# Patient Record
Sex: FEMALE | Race: WHITE | Employment: FULL TIME | URBAN - METROPOLITAN AREA
[De-identification: names, ages, dates, MRNs, and addresses within clinical notes are randomized per-mention and may not be internally consistent; named-entity substitution may affect disease eponyms.]

---

## 2017-02-20 ENCOUNTER — TELEPHONE (OUTPATIENT)
Dept: OBGYN CLINIC | Facility: CLINIC | Age: 32
End: 2017-02-20

## 2017-02-20 NOTE — TELEPHONE ENCOUNTER
Pt confirms message below and LMP of 1/21/17. Pt stated she is taking PNV and will check to make sure it contains DHA. Pt informed that our practice has 6 doctors (2 male, 3 female) and pt will see all of them throughout PN care.  Pt verbalized understandin

## 2017-02-23 ENCOUNTER — TELEPHONE (OUTPATIENT)
Dept: OBGYN CLINIC | Facility: CLINIC | Age: 32
End: 2017-02-23

## 2017-02-23 NOTE — TELEPHONE ENCOUNTER
PER PT STATE SHE NEED TO GET AN ROOT CANAL / PT WANT TO KNOW IF THE MEDICATION THAT IS USED FOR THAT PROCEDURE IS OK SINCE SHE'S PREGNANT /  PLS ADV

## 2017-02-23 NOTE — TELEPHONE ENCOUNTER
FAX # [phone] PAULA LEE. Pt states she needs this faxed over by 3/3/17; pls call once faxed lv alvaro.

## 2017-02-23 NOTE — TELEPHONE ENCOUNTER
LM with pt stating that dental letter has been faxed to number listed below. Pt to call with any questions or concerns.

## 2017-02-23 NOTE — TELEPHONE ENCOUNTER
Pt states that she needs to have a root canal.  Pt is not sure of the dentist or endodontist who will be doing it. Informed pt once she finds out who is doing the procedure to call us and we will send a letter with what medication she can use.   Pt stated

## 2017-03-16 ENCOUNTER — TELEPHONE (OUTPATIENT)
Dept: OBGYN CLINIC | Facility: CLINIC | Age: 32
End: 2017-03-16

## 2017-03-16 ENCOUNTER — NURSE ONLY (OUTPATIENT)
Dept: OBGYN CLINIC | Facility: CLINIC | Age: 32
End: 2017-03-16

## 2017-03-16 VITALS — WEIGHT: 132.38 LBS | BODY MASS INDEX: 23.75 KG/M2 | HEIGHT: 62.75 IN

## 2017-03-16 DIAGNOSIS — Z34.01 ENCOUNTER FOR SUPERVISION OF NORMAL FIRST PREGNANCY IN FIRST TRIMESTER: Primary | ICD-10-CM

## 2017-03-16 DIAGNOSIS — Z32.01 PREGNANCY EXAMINATION OR TEST, POSITIVE RESULT: ICD-10-CM

## 2017-03-16 LAB
CONTROL LINE PRESENT WITH A CLEAR BACKGROUND (YES/NO): YES YES/NO
KIT LOT #: ABNORMAL NUMERIC
PREGNANCY TEST, URINE: POSITIVE

## 2017-03-16 PROCEDURE — 81025 URINE PREGNANCY TEST: CPT | Performed by: OBSTETRICS & GYNECOLOGY

## 2017-03-16 NOTE — PROGRESS NOTES
Pt seen for OBN appt today with no complaints. Normal PN labs, Hep C and US ordered. Pt advised all labs must be completed and resulted prior to MD appt. NPN appt with MD scheduled with ELISABETH on 4/5/17.     Pt is currently taking Vitamin C and D supplement Mental Retardation/Autism Yes Pt's brother has smith-magenis syndrome (partial deletion of a chromosome)   Muscular Dystrophy No    Neural tube defects No    Sickle Cell Disease or trait No    Zack-Sachs Disease No    Thalassemia No    Other inherited gen

## 2017-03-16 NOTE — TELEPHONE ENCOUNTER
Pt was seen for her OBN appt today. Pt states she sees a chiropractor every other week and is wondering if it safe to continue. Pt states the chiropractor does see other expectant moms. Message to [facility] on call.

## 2017-03-20 NOTE — TELEPHONE ENCOUNTER
Safe to continue as long as chiropractor knows she is pregnant and will take normal precautions and is comfortable with her being pregnant.

## 2017-04-05 ENCOUNTER — LAB ENCOUNTER (OUTPATIENT)
Dept: LAB | Facility: HOSPITAL | Age: 32
End: 2017-04-05
Attending: OBSTETRICS & GYNECOLOGY
Payer: COMMERCIAL

## 2017-04-05 ENCOUNTER — INITIAL PRENATAL (OUTPATIENT)
Dept: OBGYN CLINIC | Facility: CLINIC | Age: 32
End: 2017-04-05

## 2017-04-05 VITALS
SYSTOLIC BLOOD PRESSURE: 127 MMHG | BODY MASS INDEX: 24 KG/M2 | HEART RATE: 68 BPM | WEIGHT: 133.81 LBS | DIASTOLIC BLOOD PRESSURE: 79 MMHG

## 2017-04-05 DIAGNOSIS — Z34.91 ENCOUNTER FOR SUPERVISION OF NORMAL PREGNANCY IN FIRST TRIMESTER, UNSPECIFIED GRAVIDITY: Primary | ICD-10-CM

## 2017-04-05 DIAGNOSIS — Z34.01 ENCOUNTER FOR SUPERVISION OF NORMAL FIRST PREGNANCY IN FIRST TRIMESTER: ICD-10-CM

## 2017-04-05 PROCEDURE — 87389 HIV-1 AG W/HIV-1&-2 AB AG IA: CPT

## 2017-04-05 PROCEDURE — 85025 COMPLETE CBC W/AUTO DIFF WBC: CPT

## 2017-04-05 PROCEDURE — 87086 URINE CULTURE/COLONY COUNT: CPT

## 2017-04-05 PROCEDURE — 86803 HEPATITIS C AB TEST: CPT

## 2017-04-05 PROCEDURE — 86780 TREPONEMA PALLIDUM: CPT

## 2017-04-05 PROCEDURE — 86762 RUBELLA ANTIBODY: CPT

## 2017-04-05 PROCEDURE — 76801 OB US < 14 WKS SINGLE FETUS: CPT | Performed by: OBSTETRICS & GYNECOLOGY

## 2017-04-05 PROCEDURE — 86901 BLOOD TYPING SEROLOGIC RH(D): CPT

## 2017-04-05 PROCEDURE — 36415 COLL VENOUS BLD VENIPUNCTURE: CPT

## 2017-04-05 PROCEDURE — 86900 BLOOD TYPING SEROLOGIC ABO: CPT

## 2017-04-05 PROCEDURE — 87340 HEPATITIS B SURFACE AG IA: CPT

## 2017-04-05 PROCEDURE — 86850 RBC ANTIBODY SCREEN: CPT

## 2017-04-06 PROBLEM — Z67.91 RH NEGATIVE STATE IN ANTEPARTUM PERIOD: Status: ACTIVE | Noted: 2017-04-06

## 2017-04-06 PROBLEM — B00.9 HSV INFECTION: Status: ACTIVE | Noted: 2017-04-06

## 2017-04-06 PROBLEM — O26.899 RH NEGATIVE STATE IN ANTEPARTUM PERIOD: Status: ACTIVE | Noted: 2017-04-06

## 2017-04-06 NOTE — PROGRESS NOTES
Jd at visit. He works for [employer].  Serology not back yet, just drawn today. They decline genetics. Root canal planned for about 16 weeks.

## 2017-05-03 ENCOUNTER — ROUTINE PRENATAL (OUTPATIENT)
Dept: OBGYN CLINIC | Facility: CLINIC | Age: 32
End: 2017-05-03

## 2017-05-03 VITALS
BODY MASS INDEX: 24 KG/M2 | HEART RATE: 77 BPM | SYSTOLIC BLOOD PRESSURE: 117 MMHG | DIASTOLIC BLOOD PRESSURE: 72 MMHG | WEIGHT: 135.19 LBS

## 2017-05-03 DIAGNOSIS — Z34.82 ENCOUNTER FOR SUPERVISION OF OTHER NORMAL PREGNANCY IN SECOND TRIMESTER: Primary | ICD-10-CM

## 2017-05-03 NOTE — PROGRESS NOTES
C/O hip pain for which she sees her chiropractor. Reviewed PT as an option. No bleeding or cramping. She is living separately from her  at this time before they move into their new home.    RTC 4 wks

## 2017-05-30 ENCOUNTER — ROUTINE PRENATAL (OUTPATIENT)
Dept: OBGYN CLINIC | Facility: CLINIC | Age: 32
End: 2017-05-30

## 2017-05-30 VITALS
HEART RATE: 76 BPM | BODY MASS INDEX: 24 KG/M2 | WEIGHT: 137 LBS | SYSTOLIC BLOOD PRESSURE: 102 MMHG | DIASTOLIC BLOOD PRESSURE: 62 MMHG

## 2017-05-30 DIAGNOSIS — Z34.02 ENCOUNTER FOR SUPERVISION OF NORMAL FIRST PREGNANCY IN SECOND TRIMESTER: Primary | ICD-10-CM

## 2017-05-30 NOTE — PROGRESS NOTES
C/o skin tag on right labia majora. Exam shows 1-2mm small skin tag. Discussed common pregnancy finding. If grows or becomes persistently annoying discussed removal.  RTC 4wks. Level 1 ordered. Offered msAFP today---pt and  to consider.

## 2017-06-09 ENCOUNTER — HOSPITAL ENCOUNTER (OUTPATIENT)
Dept: ULTRASOUND IMAGING | Facility: HOSPITAL | Age: 32
Discharge: HOME | End: 2017-06-09
Attending: OBSTETRICS & GYNECOLOGY
Payer: COMMERCIAL

## 2017-06-09 DIAGNOSIS — Z34.02 ENCOUNTER FOR SUPERVISION OF NORMAL FIRST PREGNANCY IN SECOND TRIMESTER: ICD-10-CM

## 2017-06-09 PROCEDURE — 76805 OB US >/= 14 WKS SNGL FETUS: CPT | Performed by: OBSTETRICS & GYNECOLOGY

## 2017-06-28 ENCOUNTER — ROUTINE PRENATAL (OUTPATIENT)
Dept: OBGYN CLINIC | Facility: CLINIC | Age: 32
End: 2017-06-28

## 2017-06-28 VITALS
WEIGHT: 137 LBS | SYSTOLIC BLOOD PRESSURE: 113 MMHG | DIASTOLIC BLOOD PRESSURE: 71 MMHG | HEART RATE: 76 BPM | BODY MASS INDEX: 24 KG/M2

## 2017-06-28 DIAGNOSIS — Z34.92 ENCOUNTER FOR SUPERVISION OF NORMAL PREGNANCY IN SECOND TRIMESTER, UNSPECIFIED GRAVIDITY: Primary | ICD-10-CM

## 2017-07-26 ENCOUNTER — ROUTINE PRENATAL (OUTPATIENT)
Dept: OBGYN CLINIC | Facility: CLINIC | Age: 32
End: 2017-07-26

## 2017-07-26 VITALS
BODY MASS INDEX: 27 KG/M2 | WEIGHT: 151 LBS | HEART RATE: 79 BPM | DIASTOLIC BLOOD PRESSURE: 66 MMHG | SYSTOLIC BLOOD PRESSURE: 108 MMHG

## 2017-07-26 DIAGNOSIS — Z34.92 ENCOUNTER FOR SUPERVISION OF NORMAL PREGNANCY IN SECOND TRIMESTER, UNSPECIFIED GRAVIDITY: Primary | ICD-10-CM

## 2017-07-26 LAB
MULTISTIX LOT#: NORMAL NUMERIC
PH, URINE: 6 (ref 4.5–8)
SPECIFIC GRAVITY: 1.01 (ref 1–1.03)

## 2017-08-08 ENCOUNTER — ROUTINE PRENATAL (OUTPATIENT)
Dept: OBGYN CLINIC | Facility: CLINIC | Age: 32
End: 2017-08-08

## 2017-08-08 ENCOUNTER — APPOINTMENT (OUTPATIENT)
Dept: LAB | Facility: HOSPITAL | Age: 32
End: 2017-08-08
Attending: OBSTETRICS & GYNECOLOGY
Payer: COMMERCIAL

## 2017-08-08 VITALS
SYSTOLIC BLOOD PRESSURE: 124 MMHG | BODY MASS INDEX: 28 KG/M2 | DIASTOLIC BLOOD PRESSURE: 78 MMHG | HEART RATE: 94 BPM | WEIGHT: 155 LBS

## 2017-08-08 DIAGNOSIS — Z34.93 ENCOUNTER FOR SUPERVISION OF NORMAL PREGNANCY IN THIRD TRIMESTER, UNSPECIFIED GRAVIDITY: Primary | ICD-10-CM

## 2017-08-08 DIAGNOSIS — Z34.92 ENCOUNTER FOR SUPERVISION OF NORMAL PREGNANCY IN SECOND TRIMESTER, UNSPECIFIED GRAVIDITY: ICD-10-CM

## 2017-08-08 LAB
ANTIBODY SCREEN: NEGATIVE
ERYTHROCYTE [DISTWIDTH] IN BLOOD BY AUTOMATED COUNT: 13.6 % (ref 11–15)
GLUCOSE 1H P 50 G GLC PO SERPL-MCNC: 116 MG/DL
HCT VFR BLD AUTO: 34.5 % (ref 35–48)
HGB BLD-MCNC: 11.8 G/DL (ref 12–16)
MCH RBC QN AUTO: 32.2 PG (ref 27–32)
MCHC RBC AUTO-ENTMCNC: 34.1 G/DL (ref 32–37)
MCV RBC AUTO: 94.3 FL (ref 80–100)
MULTISTIX LOT#: NORMAL NUMERIC
PH, URINE: 7.5 (ref 4.5–8)
PLATELET # BLD AUTO: 207 K/UL (ref 140–400)
PMV BLD AUTO: 8.6 FL (ref 7.4–10.3)
RBC # BLD AUTO: 3.66 M/UL (ref 3.7–5.4)
RH BLOOD TYPE: NEGATIVE
SPECIFIC GRAVITY: 1.01 (ref 1–1.03)
WBC # BLD AUTO: 7.3 K/UL (ref 4–11)

## 2017-08-08 PROCEDURE — 86900 BLOOD TYPING SEROLOGIC ABO: CPT

## 2017-08-08 PROCEDURE — 36415 COLL VENOUS BLD VENIPUNCTURE: CPT

## 2017-08-08 PROCEDURE — 82950 GLUCOSE TEST: CPT

## 2017-08-08 PROCEDURE — 85027 COMPLETE CBC AUTOMATED: CPT

## 2017-08-08 PROCEDURE — 90715 TDAP VACCINE 7 YRS/> IM: CPT | Performed by: OBSTETRICS & GYNECOLOGY

## 2017-08-08 PROCEDURE — 90471 IMMUNIZATION ADMIN: CPT | Performed by: OBSTETRICS & GYNECOLOGY

## 2017-08-08 PROCEDURE — 86901 BLOOD TYPING SEROLOGIC RH(D): CPT

## 2017-08-08 PROCEDURE — 86850 RBC ANTIBODY SCREEN: CPT

## 2017-08-09 ENCOUNTER — TELEPHONE (OUTPATIENT)
Dept: OBGYN CLINIC | Facility: CLINIC | Age: 32
End: 2017-08-09

## 2017-08-09 NOTE — TELEPHONE ENCOUNTER
Pt had blood drawn today, calling to schedule Rhogam. Appt accepted tomorrow morning at 0900. Pt has no further questions.

## 2017-08-10 ENCOUNTER — NURSE ONLY (OUTPATIENT)
Dept: OBGYN CLINIC | Facility: CLINIC | Age: 32
End: 2017-08-10

## 2017-08-10 VITALS — HEART RATE: 77 BPM | DIASTOLIC BLOOD PRESSURE: 72 MMHG | SYSTOLIC BLOOD PRESSURE: 107 MMHG

## 2017-08-10 DIAGNOSIS — Z67.91 RH NEGATIVE STATE IN ANTEPARTUM PERIOD, THIRD TRIMESTER: ICD-10-CM

## 2017-08-10 DIAGNOSIS — Z34.93 ENCOUNTER FOR SUPERVISION OF NORMAL PREGNANCY IN THIRD TRIMESTER, UNSPECIFIED GRAVIDITY: Primary | ICD-10-CM

## 2017-08-10 DIAGNOSIS — Z29.13 NEED FOR RHOGAM DUE TO RH NEGATIVE MOTHER: ICD-10-CM

## 2017-08-10 DIAGNOSIS — O26.893 RH NEGATIVE STATE IN ANTEPARTUM PERIOD, THIRD TRIMESTER: ICD-10-CM

## 2017-08-10 PROCEDURE — 96372 THER/PROPH/DIAG INJ SC/IM: CPT | Performed by: OBSTETRICS & GYNECOLOGY

## 2017-08-10 NOTE — PROGRESS NOTES
Rhogam injection administered to pts left upper outer gluteus. Pt tolerated well. Pt given rhogam card and VIS information sheet and instructed to call with any questions or concerns. Pt verbalized understanding.

## 2017-08-10 NOTE — PATIENT INSTRUCTIONS
If You Are Rh Negative – Routine Administration    If you’re Rh negative, ask your health care provider about getting treated with RhoGam or Rhophylac. Even if you miscarry or don’t deliver the baby, you will still need treatment.  The health of any baby as directed      Location, date and time:  [facility] [address] 8/10/17

## 2017-08-21 ENCOUNTER — ROUTINE PRENATAL (OUTPATIENT)
Dept: OBGYN CLINIC | Facility: CLINIC | Age: 32
End: 2017-08-21

## 2017-08-21 VITALS
BODY MASS INDEX: 28 KG/M2 | HEART RATE: 90 BPM | DIASTOLIC BLOOD PRESSURE: 69 MMHG | SYSTOLIC BLOOD PRESSURE: 114 MMHG | WEIGHT: 155.81 LBS

## 2017-08-21 DIAGNOSIS — Z34.93 ENCOUNTER FOR SUPERVISION OF NORMAL PREGNANCY IN THIRD TRIMESTER, UNSPECIFIED GRAVIDITY: Primary | ICD-10-CM

## 2017-08-21 LAB
MULTISTIX LOT#: NORMAL NUMERIC
PH, URINE: 6.5 (ref 4.5–8)
SPECIFIC GRAVITY: 1.01 (ref 1–1.03)
UROBILINOGEN,SEMI-QN: 0 MG/DL (ref 0–1.9)

## 2017-09-05 ENCOUNTER — ROUTINE PRENATAL (OUTPATIENT)
Dept: OBGYN CLINIC | Facility: CLINIC | Age: 32
End: 2017-09-05

## 2017-09-05 VITALS
BODY MASS INDEX: 29 KG/M2 | DIASTOLIC BLOOD PRESSURE: 69 MMHG | HEART RATE: 82 BPM | SYSTOLIC BLOOD PRESSURE: 106 MMHG | WEIGHT: 160.19 LBS

## 2017-09-05 DIAGNOSIS — Z34.93 ENCOUNTER FOR SUPERVISION OF NORMAL PREGNANCY IN THIRD TRIMESTER, UNSPECIFIED GRAVIDITY: Primary | ICD-10-CM

## 2017-09-05 LAB
MULTISTIX LOT#: NORMAL NUMERIC
PH, URINE: 6 (ref 4.5–8)
SPECIFIC GRAVITY: 1.01 (ref 1–1.03)
UROBILINOGEN,SEMI-QN: 0 MG/DL (ref 0–1.9)

## 2017-09-09 ENCOUNTER — TELEPHONE (OUTPATIENT)
Dept: OBGYN CLINIC | Facility: CLINIC | Age: 32
End: 2017-09-09

## 2017-09-09 NOTE — TELEPHONE ENCOUNTER
ON CALL-- spoke with Dr William Dove, OB/GYN. Pt passed out and hit side of head on bathtub. Went to [facility]. Apparently pt has h/o seizures in past, not stated in our history.   Since fall was not witnessed, unclear whether she had s

## 2017-09-18 ENCOUNTER — ROUTINE PRENATAL (OUTPATIENT)
Dept: OBGYN CLINIC | Facility: CLINIC | Age: 32
End: 2017-09-18

## 2017-09-18 VITALS
WEIGHT: 161.19 LBS | BODY MASS INDEX: 29 KG/M2 | DIASTOLIC BLOOD PRESSURE: 74 MMHG | HEART RATE: 76 BPM | SYSTOLIC BLOOD PRESSURE: 107 MMHG

## 2017-09-18 DIAGNOSIS — Z34.93 ENCOUNTER FOR SUPERVISION OF NORMAL PREGNANCY IN THIRD TRIMESTER, UNSPECIFIED GRAVIDITY: Primary | ICD-10-CM

## 2017-09-18 LAB
MULTISTIX LOT#: NORMAL NUMERIC
PH, URINE: 7.5 (ref 4.5–8)
SPECIFIC GRAVITY: 1.01 (ref 1–1.03)
UROBILINOGEN,SEMI-QN: 0.2 MG/DL (ref 0–1.9)

## 2017-09-18 RX ORDER — VALACYCLOVIR HYDROCHLORIDE 1 G/1
1 TABLET, FILM COATED ORAL DAILY
Qty: 30 TABLET | Refills: 1 | Status: SHIPPED | OUTPATIENT
Start: 2017-09-18 | End: 2017-10-18

## 2017-09-30 ENCOUNTER — APPOINTMENT (OUTPATIENT)
Dept: LAB | Facility: HOSPITAL | Age: 32
End: 2017-09-30
Attending: OBSTETRICS & GYNECOLOGY
Payer: COMMERCIAL

## 2017-09-30 DIAGNOSIS — Z34.93 ENCOUNTER FOR SUPERVISION OF NORMAL PREGNANCY IN THIRD TRIMESTER, UNSPECIFIED GRAVIDITY: ICD-10-CM

## 2017-09-30 PROCEDURE — 36415 COLL VENOUS BLD VENIPUNCTURE: CPT

## 2017-09-30 PROCEDURE — 86780 TREPONEMA PALLIDUM: CPT

## 2017-09-30 PROCEDURE — 85027 COMPLETE CBC AUTOMATED: CPT

## 2017-10-02 ENCOUNTER — ROUTINE PRENATAL (OUTPATIENT)
Dept: OBGYN CLINIC | Facility: CLINIC | Age: 32
End: 2017-10-02

## 2017-10-02 VITALS
BODY MASS INDEX: 30 KG/M2 | WEIGHT: 166.63 LBS | HEART RATE: 97 BPM | DIASTOLIC BLOOD PRESSURE: 65 MMHG | SYSTOLIC BLOOD PRESSURE: 104 MMHG

## 2017-10-02 DIAGNOSIS — Z34.93 ENCOUNTER FOR SUPERVISION OF NORMAL PREGNANCY IN THIRD TRIMESTER, UNSPECIFIED GRAVIDITY: Primary | ICD-10-CM

## 2017-10-09 ENCOUNTER — ROUTINE PRENATAL (OUTPATIENT)
Dept: OBGYN CLINIC | Facility: CLINIC | Age: 32
End: 2017-10-09

## 2017-10-09 VITALS
DIASTOLIC BLOOD PRESSURE: 70 MMHG | BODY MASS INDEX: 30 KG/M2 | HEART RATE: 89 BPM | WEIGHT: 166 LBS | SYSTOLIC BLOOD PRESSURE: 107 MMHG

## 2017-10-09 DIAGNOSIS — Z34.93 ENCOUNTER FOR SUPERVISION OF NORMAL PREGNANCY IN THIRD TRIMESTER, UNSPECIFIED GRAVIDITY: Primary | ICD-10-CM

## 2017-10-09 PROCEDURE — 90471 IMMUNIZATION ADMIN: CPT | Performed by: OBSTETRICS & GYNECOLOGY

## 2017-10-09 PROCEDURE — 90686 IIV4 VACC NO PRSV 0.5 ML IM: CPT | Performed by: OBSTETRICS & GYNECOLOGY

## 2017-10-17 ENCOUNTER — ROUTINE PRENATAL (OUTPATIENT)
Dept: OBGYN CLINIC | Facility: CLINIC | Age: 32
End: 2017-10-17

## 2017-10-17 VITALS
WEIGHT: 170 LBS | BODY MASS INDEX: 30 KG/M2 | HEART RATE: 85 BPM | DIASTOLIC BLOOD PRESSURE: 75 MMHG | SYSTOLIC BLOOD PRESSURE: 119 MMHG

## 2017-10-17 DIAGNOSIS — Z34.93 ENCOUNTER FOR SUPERVISION OF NORMAL PREGNANCY IN THIRD TRIMESTER, UNSPECIFIED GRAVIDITY: Primary | ICD-10-CM

## 2017-10-25 ENCOUNTER — ROUTINE PRENATAL (OUTPATIENT)
Dept: OBGYN CLINIC | Facility: CLINIC | Age: 32
End: 2017-10-25

## 2017-10-25 VITALS
BODY MASS INDEX: 31 KG/M2 | WEIGHT: 174.38 LBS | SYSTOLIC BLOOD PRESSURE: 119 MMHG | DIASTOLIC BLOOD PRESSURE: 79 MMHG | HEART RATE: 106 BPM

## 2017-10-25 DIAGNOSIS — Z34.93 ENCOUNTER FOR SUPERVISION OF NORMAL PREGNANCY IN THIRD TRIMESTER, UNSPECIFIED GRAVIDITY: Primary | ICD-10-CM

## 2017-11-04 ENCOUNTER — HOSPITAL ENCOUNTER (OUTPATIENT)
Facility: HOSPITAL | Age: 32
Setting detail: OBSERVATION
Discharge: HOME | End: 2017-11-04
Attending: OBSTETRICS & GYNECOLOGY | Admitting: OBSTETRICS & GYNECOLOGY
Payer: COMMERCIAL

## 2017-11-04 ENCOUNTER — ROUTINE PRENATAL (OUTPATIENT)
Dept: OBGYN CLINIC | Facility: CLINIC | Age: 32
End: 2017-11-04

## 2017-11-04 VITALS
DIASTOLIC BLOOD PRESSURE: 72 MMHG | HEART RATE: 86 BPM | BODY MASS INDEX: 31 KG/M2 | WEIGHT: 174 LBS | SYSTOLIC BLOOD PRESSURE: 119 MMHG

## 2017-11-04 VITALS — SYSTOLIC BLOOD PRESSURE: 118 MMHG | HEART RATE: 77 BPM | DIASTOLIC BLOOD PRESSURE: 62 MMHG

## 2017-11-04 DIAGNOSIS — Z34.03 ENCOUNTER FOR SUPERVISION OF NORMAL FIRST PREGNANCY IN THIRD TRIMESTER: Primary | ICD-10-CM

## 2017-11-04 PROBLEM — O28.8 NON-REACTIVE NST (NON-STRESS TEST): Status: ACTIVE | Noted: 2017-11-04

## 2017-11-04 PROCEDURE — 59025 FETAL NON-STRESS TEST: CPT | Performed by: OBSTETRICS & GYNECOLOGY

## 2017-11-04 PROCEDURE — 76815 OB US LIMITED FETUS(S): CPT | Performed by: OBSTETRICS & GYNECOLOGY

## 2017-11-04 RX ORDER — DEXTROSE, SODIUM CHLORIDE, SODIUM LACTATE, POTASSIUM CHLORIDE, AND CALCIUM CHLORIDE 5; .6; .31; .03; .02 G/100ML; G/100ML; G/100ML; G/100ML; G/100ML
125 INJECTION, SOLUTION INTRAVENOUS CONTINUOUS
Status: CANCELLED | OUTPATIENT
Start: 2017-11-04

## 2017-11-04 RX ORDER — LIDOCAINE HYDROCHLORIDE 10 MG/ML
30 INJECTION, SOLUTION EPIDURAL; INFILTRATION; INTRACAUDAL; PERINEURAL ONCE
Status: CANCELLED | OUTPATIENT
Start: 2017-11-04 | End: 2017-11-04

## 2017-11-04 RX ORDER — TRISODIUM CITRATE DIHYDRATE AND CITRIC ACID MONOHYDRATE 500; 334 MG/5ML; MG/5ML
30 SOLUTION ORAL AS NEEDED
Status: CANCELLED | OUTPATIENT
Start: 2017-11-04

## 2017-11-04 RX ORDER — IBUPROFEN 600 MG/1
600 TABLET ORAL ONCE AS NEEDED
Status: CANCELLED | OUTPATIENT
Start: 2017-11-04 | End: 2017-11-06

## 2017-11-04 RX ORDER — AMMONIA INHALANTS 0.04 G/.3ML
0.3 INHALANT RESPIRATORY (INHALATION) AS NEEDED
Status: CANCELLED | OUTPATIENT
Start: 2017-11-04

## 2017-11-04 RX ORDER — SODIUM CHLORIDE 0.9 % (FLUSH) 0.9 %
10 SYRINGE (ML) INJECTION AS NEEDED
Status: CANCELLED | OUTPATIENT
Start: 2017-11-04

## 2017-11-04 RX ORDER — TERBUTALINE SULFATE 1 MG/ML
0.25 INJECTION, SOLUTION SUBCUTANEOUS AS NEEDED
Status: CANCELLED | OUTPATIENT
Start: 2017-11-04

## 2017-11-04 NOTE — NST
Nonstress Test   Patient: Mikaela Waite    Gestation: 41w0d    NST:       Variability: Moderate           Accelerations: Yes           Decelerations: None            Baseline: 130 BPM           Uterine Irritability: No           Contractions: Not pre

## 2017-11-07 ENCOUNTER — HOSPITAL ENCOUNTER (INPATIENT)
Dept: OBGYN CLINIC | Facility: HOSPITAL | Age: 32
End: 2017-11-07
Attending: OBSTETRICS & GYNECOLOGY
Payer: COMMERCIAL

## 2017-11-07 ENCOUNTER — HOSPITAL ENCOUNTER (INPATIENT)
Facility: HOSPITAL | Age: 32
LOS: 5 days | Discharge: HOME | End: 2017-11-12
Attending: OBSTETRICS & GYNECOLOGY | Admitting: OBSTETRICS & GYNECOLOGY
Payer: COMMERCIAL

## 2017-11-07 VITALS — HEIGHT: 62 IN | WEIGHT: 174 LBS | BODY MASS INDEX: 32.02 KG/M2

## 2017-11-07 PROCEDURE — 3E0P7VZ INTRODUCTION OF HORMONE INTO FEMALE REPRODUCTIVE, VIA NATURAL OR ARTIFICIAL OPENING: ICD-10-PCS | Performed by: OBSTETRICS & GYNECOLOGY

## 2017-11-07 RX ORDER — TRISODIUM CITRATE DIHYDRATE AND CITRIC ACID MONOHYDRATE 500; 334 MG/5ML; MG/5ML
30 SOLUTION ORAL AS NEEDED
Status: DISCONTINUED | OUTPATIENT
Start: 2017-11-07 | End: 2017-11-10 | Stop reason: HOSPADM

## 2017-11-07 RX ORDER — DEXTROSE, SODIUM CHLORIDE, SODIUM LACTATE, POTASSIUM CHLORIDE, AND CALCIUM CHLORIDE 5; .6; .31; .03; .02 G/100ML; G/100ML; G/100ML; G/100ML; G/100ML
INJECTION, SOLUTION INTRAVENOUS
Status: DISPENSED
Start: 2017-11-07 | End: 2017-11-08

## 2017-11-07 RX ORDER — DEXTROSE, SODIUM CHLORIDE, SODIUM LACTATE, POTASSIUM CHLORIDE, AND CALCIUM CHLORIDE 5; .6; .31; .03; .02 G/100ML; G/100ML; G/100ML; G/100ML; G/100ML
INJECTION, SOLUTION INTRAVENOUS
Status: DISCONTINUED
Start: 2017-11-07 | End: 2017-11-07 | Stop reason: WASHOUT

## 2017-11-07 RX ORDER — VALACYCLOVIR HYDROCHLORIDE 500 MG/1
1000 TABLET, FILM COATED ORAL DAILY
COMMUNITY
Start: 2017-09-07 | End: 2017-11-12

## 2017-11-07 RX ORDER — IBUPROFEN 600 MG/1
600 TABLET ORAL ONCE AS NEEDED
Status: ACTIVE | OUTPATIENT
Start: 2017-11-07 | End: 2017-11-09

## 2017-11-07 RX ORDER — LIDOCAINE HYDROCHLORIDE 10 MG/ML
30 INJECTION, SOLUTION EPIDURAL; INFILTRATION; INTRACAUDAL; PERINEURAL ONCE
Status: DISCONTINUED | OUTPATIENT
Start: 2017-11-07 | End: 2017-11-10 | Stop reason: HOSPADM

## 2017-11-07 RX ORDER — AMMONIA INHALANTS 0.04 G/.3ML
0.3 INHALANT RESPIRATORY (INHALATION) AS NEEDED
Status: DISCONTINUED | OUTPATIENT
Start: 2017-11-07 | End: 2017-11-10 | Stop reason: HOSPADM

## 2017-11-07 RX ORDER — SODIUM CHLORIDE 0.9 % (FLUSH) 0.9 %
10 SYRINGE (ML) INJECTION AS NEEDED
Status: DISCONTINUED | OUTPATIENT
Start: 2017-11-07 | End: 2017-11-10 | Stop reason: HOSPADM

## 2017-11-07 RX ORDER — TERBUTALINE SULFATE 1 MG/ML
0.25 INJECTION, SOLUTION SUBCUTANEOUS AS NEEDED
Status: DISCONTINUED | OUTPATIENT
Start: 2017-11-07 | End: 2017-11-10 | Stop reason: HOSPADM

## 2017-11-07 RX ORDER — DEXTROSE, SODIUM CHLORIDE, SODIUM LACTATE, POTASSIUM CHLORIDE, AND CALCIUM CHLORIDE 5; .6; .31; .03; .02 G/100ML; G/100ML; G/100ML; G/100ML; G/100ML
125 INJECTION, SOLUTION INTRAVENOUS CONTINUOUS
Status: DISCONTINUED | OUTPATIENT
Start: 2017-11-07 | End: 2017-11-10 | Stop reason: HOSPADM

## 2017-11-08 PROCEDURE — 3E033VJ INTRODUCTION OF OTHER HORMONE INTO PERIPHERAL VEIN, PERCUTANEOUS APPROACH: ICD-10-PCS | Performed by: OBSTETRICS & GYNECOLOGY

## 2017-11-08 RX ORDER — DEXTROSE, SODIUM CHLORIDE, SODIUM LACTATE, POTASSIUM CHLORIDE, AND CALCIUM CHLORIDE 5; .6; .31; .03; .02 G/100ML; G/100ML; G/100ML; G/100ML; G/100ML
INJECTION, SOLUTION INTRAVENOUS
Status: DISPENSED
Start: 2017-11-08 | End: 2017-11-08

## 2017-11-08 NOTE — H&P
[id number] [facility] [facility] Patient Status:  Inpatient    6/10/1985 MRN [id number]   Location [address] Attending Ana Orellana MD   Hosp Day # 1 PCP Don Tai MD     Date o Allergies    Medications:    Prescriptions Prior to Admission:  ValACYclovir HCl 500 MG Oral Tab Take 1,000 mg by mouth daily.  Disp:  Rfl:  11/7/2017 at 1800   Prenatal Vit-Fe Fumarate-FA (PRENATAL VITAMINS PLUS) 27-1 MG Oral Tab Take 1 tablet by mouth el Negative. Patient Active Problem List:     Rh negative state in antepartum period     HSV infection     Non-reactive NST (non-stress test)      Treatment Plan:    Marker given to jef which contractions are 7/10 in intensity to help dose pitocen.  If does n

## 2017-11-08 NOTE — PLAN OF CARE
Problem: ANXIETY  Goal: Will report anxiety at manageable levels  INTERVENTIONS:  - Administer medication as ordered  - Teach and rehearse alternative coping skills  - Provide emotional support with 1:1 interaction with staff  Outcome: Progressing

## 2017-11-08 NOTE — PROGRESS NOTES
Pt given marker and instructed to jef cx rating >7/10; RNs ordered to titrate pitocin to 30mu/min Prn based on pattern and pt marking

## 2017-11-08 NOTE — PROGRESS NOTES
11/8/2017, 5:35 PM    Subjective:    Had cervidil last night. Had approx 10 hrs of pitocin today. Feeling cramping.     Objective:   11/08/17  1130 11/08/17  1215 11/08/17  1535 11/08/17  1700   BP: 116/63 121/72 121/72 120/76   BP Location: Left arm  Lef

## 2017-11-09 ENCOUNTER — ANESTHESIA (OUTPATIENT)
Dept: OBGYN CLINIC | Facility: HOSPITAL | Age: 32
End: 2017-11-09
Payer: COMMERCIAL

## 2017-11-09 ENCOUNTER — ANESTHESIA EVENT (OUTPATIENT)
Dept: OBGYN CLINIC | Facility: HOSPITAL | Age: 32
End: 2017-11-09
Payer: COMMERCIAL

## 2017-11-09 RX ORDER — LIDOCAINE HYDROCHLORIDE AND EPINEPHRINE 15; 5 MG/ML; UG/ML
INJECTION, SOLUTION EPIDURAL AS NEEDED
Status: DISCONTINUED | OUTPATIENT
Start: 2017-11-09 | End: 2017-11-10 | Stop reason: SURG

## 2017-11-09 RX ORDER — BUPIVACAINE HYDROCHLORIDE 2.5 MG/ML
INJECTION, SOLUTION EPIDURAL; INFILTRATION; INTRACAUDAL
Status: COMPLETED
Start: 2017-11-09 | End: 2017-11-09

## 2017-11-09 RX ORDER — EPHEDRINE SULFATE/0.9% NACL/PF 25 MG/5 ML
SYRINGE (ML) INTRAVENOUS
Status: DISCONTINUED
Start: 2017-11-09 | End: 2017-11-10 | Stop reason: WASHOUT

## 2017-11-09 RX ORDER — EPHEDRINE SULFATE/0.9% NACL/PF 25 MG/5 ML
5 SYRINGE (ML) INTRAVENOUS AS NEEDED
Status: DISCONTINUED | OUTPATIENT
Start: 2017-11-09 | End: 2017-11-12

## 2017-11-09 RX ORDER — NALBUPHINE HCL 10 MG/ML
2.5 AMPUL (ML) INJECTION
Status: DISCONTINUED | OUTPATIENT
Start: 2017-11-09 | End: 2017-11-12

## 2017-11-09 RX ORDER — BUPIVACAINE HYDROCHLORIDE 2.5 MG/ML
INJECTION, SOLUTION EPIDURAL; INFILTRATION; INTRACAUDAL AS NEEDED
Status: DISCONTINUED | OUTPATIENT
Start: 2017-11-09 | End: 2017-11-10 | Stop reason: SURG

## 2017-11-09 RX ORDER — LIDOCAINE HYDROCHLORIDE 10 MG/ML
INJECTION, SOLUTION EPIDURAL; INFILTRATION; INTRACAUDAL; PERINEURAL
Status: DISPENSED
Start: 2017-11-09 | End: 2017-11-10

## 2017-11-09 RX ORDER — NALBUPHINE HCL 10 MG/ML
10 AMPUL (ML) INJECTION ONCE
Status: COMPLETED | OUTPATIENT
Start: 2017-11-09 | End: 2017-11-09

## 2017-11-09 RX ORDER — HYDROXYZINE HYDROCHLORIDE 50 MG/ML
50 INJECTION, SOLUTION INTRAMUSCULAR ONCE
Status: COMPLETED | OUTPATIENT
Start: 2017-11-09 | End: 2017-11-09

## 2017-11-09 RX ORDER — PHENYLEPHRINE HCL IN 0.9% NACL 0.5 MG/5ML
SYRINGE (ML) INTRAVENOUS
Status: DISCONTINUED
Start: 2017-11-09 | End: 2017-11-10 | Stop reason: WASHOUT

## 2017-11-09 RX ORDER — SODIUM CHLORIDE, SODIUM LACTATE, POTASSIUM CHLORIDE, CALCIUM CHLORIDE 600; 310; 30; 20 MG/100ML; MG/100ML; MG/100ML; MG/100ML
INJECTION, SOLUTION INTRAVENOUS
Status: COMPLETED
Start: 2017-11-09 | End: 2017-11-09

## 2017-11-09 RX ORDER — LIDOCAINE HYDROCHLORIDE 10 MG/ML
INJECTION, SOLUTION EPIDURAL; INFILTRATION; INTRACAUDAL; PERINEURAL AS NEEDED
Status: DISCONTINUED | OUTPATIENT
Start: 2017-11-09 | End: 2017-11-10 | Stop reason: SURG

## 2017-11-09 RX ORDER — EPHEDRINE SULFATE/0.9% NACL/PF 25 MG/5 ML
SYRINGE (ML) INTRAVENOUS
Status: DISPENSED
Start: 2017-11-09 | End: 2017-11-09

## 2017-11-09 RX ORDER — 0.9 % SODIUM CHLORIDE 0.9 %
VIAL (ML) INJECTION
Status: COMPLETED
Start: 2017-11-09 | End: 2017-11-09

## 2017-11-09 RX ADMIN — BUPIVACAINE HYDROCHLORIDE 10 ML: 2.5 INJECTION, SOLUTION EPIDURAL; INFILTRATION; INTRACAUDAL at 08:42:00

## 2017-11-09 RX ADMIN — LIDOCAINE HYDROCHLORIDE AND EPINEPHRINE 5 ML: 15; 5 INJECTION, SOLUTION EPIDURAL at 08:42:00

## 2017-11-09 RX ADMIN — LIDOCAINE HYDROCHLORIDE 5 ML: 10 INJECTION, SOLUTION EPIDURAL; INFILTRATION; INTRACAUDAL; PERINEURAL at 08:42:00

## 2017-11-09 NOTE — ANESTHESIA PROCEDURE NOTES
Labor Analgesia  Performed by: Yady Soliman  Authorized by: Yady Soliman     Patient Location:  OB  Start Time:  11/9/2017 8:40 AM  Site Identification: surface landmarks    Reason for Block: labor epidural    Anesthesiologist:  Dai Weston

## 2017-11-09 NOTE — PROGRESS NOTES
11/9/2017, 9:12 AM    Subjective:     Got 3 doses of cytotec. At 0500 VE per RN 1/80/-2. SROM at 0720.    VE per RN 0830 3/90/-2  Comfortable with epidural.    Objective:      Fetal heart tones:  FHT :  140s with moderate variability  Decelerations: No  C

## 2017-11-09 NOTE — ANESTHESIA PREPROCEDURE EVALUATION
Anesthesia PreOp Note    HPI:     Margarita Harris is a 33year old female who presents for preoperative consultation requested by: * No surgeons listed *    Date of Surgery:     * No procedures listed *  Indication: * No pre-op diagnosis entered * citrate (SUBLIMAZE) 0.05 MG/ML injection   PRN Bonny Ugalde  mcg at 11/09/17 0842    lidocaine-EPINEPHrine 1.5 %-1:208154 injection   PRN Bonny Ugalde MD 5 mL at 11/09/17 0842    fentaNYL 2mcg/ml & bupivacaine 1.25mg/ml epidural infusion  Epidu tobacco: Never Used    Alcohol use Yes  0.0 oz/week     Comment: social    Drug use: No    Sexual activity: Yes    Birth control/ protection: Condom     Other Topics Concern    Pt has a pacemaker No    Pt has a defibrillator No    Reaction to local anesthe NICHOL  11/9/2017 8:56 AM

## 2017-11-10 PROCEDURE — 0DQR0ZZ REPAIR ANAL SPHINCTER, OPEN APPROACH: ICD-10-PCS | Performed by: OBSTETRICS & GYNECOLOGY

## 2017-11-10 PROCEDURE — 59400 OBSTETRICAL CARE: CPT | Performed by: OBSTETRICS & GYNECOLOGY

## 2017-11-10 RX ORDER — DIAPER,BRIEF,INFANT-TODD,DISP
1 EACH MISCELLANEOUS EVERY 6 HOURS PRN
Status: DISCONTINUED | OUTPATIENT
Start: 2017-11-10 | End: 2017-11-12

## 2017-11-10 RX ORDER — HYDROCODONE BITARTRATE AND ACETAMINOPHEN 5; 325 MG/1; MG/1
1 TABLET ORAL EVERY 6 HOURS PRN
Status: DISCONTINUED | OUTPATIENT
Start: 2017-11-10 | End: 2017-11-12

## 2017-11-10 RX ORDER — ONDANSETRON 2 MG/ML
4 INJECTION INTRAMUSCULAR; INTRAVENOUS EVERY 6 HOURS PRN
Status: DISCONTINUED | OUTPATIENT
Start: 2017-11-10 | End: 2017-11-12

## 2017-11-10 RX ORDER — IBUPROFEN 200 MG
400 TABLET ORAL EVERY 4 HOURS PRN
Status: DISCONTINUED | OUTPATIENT
Start: 2017-11-10 | End: 2017-11-12

## 2017-11-10 RX ORDER — SIMETHICONE 80 MG
80 TABLET,CHEWABLE ORAL 3 TIMES DAILY PRN
Status: DISCONTINUED | OUTPATIENT
Start: 2017-11-10 | End: 2017-11-12

## 2017-11-10 RX ORDER — AMMONIA INHALANTS 0.04 G/.3ML
INHALANT RESPIRATORY (INHALATION)
Status: DISPENSED
Start: 2017-11-10 | End: 2017-11-10

## 2017-11-10 RX ORDER — BISACODYL 10 MG
10 SUPPOSITORY, RECTAL RECTAL ONCE AS NEEDED
Status: DISCONTINUED | OUTPATIENT
Start: 2017-11-10 | End: 2017-11-12

## 2017-11-10 RX ORDER — DOCUSATE SODIUM 100 MG/1
100 CAPSULE, LIQUID FILLED ORAL 2 TIMES DAILY
Status: DISCONTINUED | OUTPATIENT
Start: 2017-11-10 | End: 2017-11-12

## 2017-11-10 RX ORDER — SODIUM CHLORIDE 0.9 % (FLUSH) 0.9 %
10 SYRINGE (ML) INJECTION AS NEEDED
Status: DISCONTINUED | OUTPATIENT
Start: 2017-11-10 | End: 2017-11-12

## 2017-11-10 RX ORDER — IBUPROFEN 600 MG/1
600 TABLET ORAL EVERY 4 HOURS PRN
Status: DISCONTINUED | OUTPATIENT
Start: 2017-11-10 | End: 2017-11-12

## 2017-11-10 RX ORDER — PRENATAL VIT,CAL 76/IRON/FOLIC 29 MG-1 MG
1 TABLET ORAL DAILY
Status: DISCONTINUED | OUTPATIENT
Start: 2017-11-10 | End: 2017-11-12

## 2017-11-10 RX ORDER — IBUPROFEN 200 MG
200 TABLET ORAL EVERY 4 HOURS PRN
Status: DISCONTINUED | OUTPATIENT
Start: 2017-11-10 | End: 2017-11-12

## 2017-11-10 RX ORDER — AMMONIA INHALANTS 0.04 G/.3ML
0.3 INHALANT RESPIRATORY (INHALATION) AS NEEDED
Status: DISCONTINUED | OUTPATIENT
Start: 2017-11-10 | End: 2017-11-12

## 2017-11-10 NOTE — DISCHARGE SUMMARY
[facility]    Discharge Summary    Margarita Harris Patient Status:  Inpatient    6/10/1985 MRN [id number]   Location [address] [facility] Attending Cb Luis MD   [facility] Day # 3       Admission date:  11/10/17

## 2017-11-10 NOTE — LACTATION NOTE
This note was copied from a baby's chart. LACTATION NOTE - INFANT    Evaluation Type  Evaluation Type: Inpatient    Problems & Assessment  Problems Diagnosed or Identified: Sleepy; Latch difficulty; Shallow latch  Infant Assessment: Skin color: pink or appr

## 2017-11-10 NOTE — ANESTHESIA POSTPROCEDURE EVALUATION
Patient: Saturnino Cockayne    Procedure Summary     Date:  11/09/17 Room / Location:  [facility] Obstetrics Outpatient    Anesthesia Start:  0840 Anesthesia Stop:  11/10/17 0302    Procedure:  [facility] CERVIDIL PM Diagnosis:      Scheduled Providers:

## 2017-11-10 NOTE — L&D DELIVERY NOTE
[facility] HOSP - [facility]    Vaginal Delivery Note    Shakila Yarbrough Patient Status:  Inpatient    6/10/1985 MRN [id number]   Location @[facility]@ Attending Tammi Padilla MD   Hosp Day # 3 PCP Jennifer Hong MD     Delivery     Infant Info:  Date

## 2017-11-10 NOTE — LACTATION NOTE
LACTATION NOTE - MOTHER      Evaluation Type: Inpatient    Problems identified  Problems identified: Knowledge deficit; Flat nipple(s)    Maternal history  Maternal history: Anxiety;Depression    Breastfeeding goal  Breastfeeding goal: To maintain breast mi

## 2017-11-11 NOTE — PROGRESS NOTES
[facility]    OB/Gyne Post  Progress Note      Michael Duvall Patient Status:  Inpatient    6/10/1985 MRN [id number]   Location [facility] 3SE Attending Sabine Marsh MD   Hosp Day # 4 PCP MD Kelly Mcbride

## 2017-11-11 NOTE — LACTATION NOTE
LACTATION NOTE - MOTHER      Evaluation Type: Inpatient    Problems identified  Problems identified: Knowledge deficit    Maternal history  Maternal history: Anxiety;Depression  Other/comment: +Kiesha, Rh Negative, hx HSV, hx HPV, psoriasis    Breastfeedin

## 2017-11-11 NOTE — PLAN OF CARE
Problem: BREAST FEEDING  Goal: Optimize infant feeding at the breast  INTERVENTIONS:  - Initiate breast feeding within first hour after birth. - Monitor effectiveness of current breast feeding efforts.   - Assess support systems available to mother/family behavioral state)  -  Positioning techniques  -  Discourage use of pacifier-artificial nipple  -  Educate mother on feeding cues   Outcome: Progressing  Baby is under phototherapy and refuses formula supplements. Pumping initiated and instructed.   Needed

## 2017-11-12 VITALS
OXYGEN SATURATION: 98 % | HEART RATE: 98 BPM | DIASTOLIC BLOOD PRESSURE: 70 MMHG | RESPIRATION RATE: 16 BRPM | TEMPERATURE: 98 F | SYSTOLIC BLOOD PRESSURE: 122 MMHG

## 2017-11-12 NOTE — PROGRESS NOTES
Pt has 3rd degree repair, MD Pizarro reguested colace BId at home and to see her in 2 wks then 3 wks.  Pt informed

## 2017-11-12 NOTE — LACTATION NOTE
LACTATION NOTE - MOTHER      Evaluation Type: Inpatient    Problems identified  Problems identified: Knowledge deficit  Problems Identified Other: Formula supplement without pumping but continued putting infant to the breast.     Maternal history  Maternal

## 2017-11-12 NOTE — LACTATION NOTE
This note was copied from a baby's chart.   LACTATION NOTE - INFANT         Problems & Assessment  Problems Diagnosed or Identified: Shallow latch  Infant Assessment: Skin color: pink or appropriate for ethnicity;Hunger cues present  Muscle tone: Appropriat

## 2017-11-27 ENCOUNTER — POSTPARTUM (OUTPATIENT)
Dept: OBGYN CLINIC | Facility: CLINIC | Age: 32
End: 2017-11-27

## 2017-11-27 VITALS
WEIGHT: 146 LBS | BODY MASS INDEX: 27 KG/M2 | SYSTOLIC BLOOD PRESSURE: 113 MMHG | DIASTOLIC BLOOD PRESSURE: 74 MMHG | HEART RATE: 70 BPM

## 2017-11-27 PROCEDURE — 99213 OFFICE O/P EST LOW 20 MIN: CPT | Performed by: OBSTETRICS & GYNECOLOGY

## 2017-11-29 PROBLEM — O26.899 RH NEGATIVE STATE IN ANTEPARTUM PERIOD: Status: RESOLVED | Noted: 2017-04-06 | Resolved: 2017-11-29

## 2017-11-29 PROBLEM — O28.8 NON-REACTIVE NST (NON-STRESS TEST): Status: RESOLVED | Noted: 2017-11-04 | Resolved: 2017-11-29

## 2017-11-29 PROBLEM — Z67.91 RH NEGATIVE STATE IN ANTEPARTUM PERIOD: Status: RESOLVED | Noted: 2017-04-06 | Resolved: 2017-11-29

## 2017-11-29 NOTE — PROGRESS NOTES
HPI:    Patient ID: Dawit Gruber is a 33year old female. HPI   S/P  with Dr Piter Castaneda on 11-10-17. She is here for f/u exam due to 3rd degree perineal laceration and repair. No complaints with respect to perineum , bowel or bladder habits.

## 2017-12-28 ENCOUNTER — POSTPARTUM (OUTPATIENT)
Dept: OBGYN CLINIC | Facility: CLINIC | Age: 32
End: 2017-12-28

## 2017-12-28 ENCOUNTER — TELEPHONE (OUTPATIENT)
Dept: OBGYN CLINIC | Facility: CLINIC | Age: 32
End: 2017-12-28

## 2017-12-28 VITALS
HEART RATE: 72 BPM | BODY MASS INDEX: 26 KG/M2 | SYSTOLIC BLOOD PRESSURE: 101 MMHG | WEIGHT: 141 LBS | DIASTOLIC BLOOD PRESSURE: 68 MMHG

## 2017-12-28 DIAGNOSIS — F32.9 REACTIVE DEPRESSION (SITUATIONAL): ICD-10-CM

## 2017-12-28 PROBLEM — B00.9 HSV INFECTION: Status: RESOLVED | Noted: 2017-04-06 | Resolved: 2017-12-28

## 2017-12-28 NOTE — PROGRESS NOTES
HPI    Erica Kiran is a 33year old female  here for 6 week post-partum visit. Patient delivered a  male infant on 11/10/17.   Pt has been very stressed due to her son was tongue and lip tied and as a result has difficulty with oral motor fu nuvaring, orthoevra patch, nexplanon, Depoprovera, condoms or tubal sterilization options. Patient has chosen condom. Patient to return for annual gyne exam in July   Pt will inform me of worsening depression symptoms.     No orders of the defined types we

## 2017-12-28 NOTE — TELEPHONE ENCOUNTER
This pt was seen by CAP for PP check today, I believe this was sent in error. CAP's 1130 pt this morning has office notes stating pt would like to be called with 20 wk US results. Will call that pt once they are resulted. Routed to CAP for sign off.

## 2017-12-28 NOTE — TELEPHONE ENCOUNTER
Pt 20 week US done last pm was not yet resulted at time of her visit, she would like to be called with result when available

## 2018-01-17 ENCOUNTER — TELEPHONE (OUTPATIENT)
Dept: ADMINISTRATIVE | Age: 33
End: 2018-01-17

## 2018-01-17 NOTE — TELEPHONE ENCOUNTER
Good afternoon Dr. Yasmeen Carlos    Patient is requesting extension of her short term disability for postpartum depression for 2 weeks. Do you approve? Please advise.     Thanks  Amor Stauffer

## 2018-01-20 NOTE — TELEPHONE ENCOUNTER
Please call this patient and see if her symptoms are improving. She is having a difficult time due to feeding issues with her son per her postpartum visit.   Make sure she is still seeing her therapist.  She is now requesting 2 more weeks off for disabilit

## 2018-01-22 NOTE — TELEPHONE ENCOUNTER
2 week extension for disability faxed to [facility]. Original mailed to patient. Request complete.

## 2018-01-29 NOTE — TELEPHONE ENCOUNTER
Good afternoon Dr. Bran Pritchett is denying the two week extension for patients medical leave.  Would it be possible for you to write a brief letter stating it was medically necessary for patient to extend her leave two weeks due to her con

## 2018-01-31 ENCOUNTER — TELEPHONE (OUTPATIENT)
Dept: OBGYN CLINIC | Facility: CLINIC | Age: 33
End: 2018-01-31

## 2018-04-09 ENCOUNTER — OFFICE VISIT (OUTPATIENT)
Dept: INTERNAL MEDICINE CLINIC | Facility: CLINIC | Age: 33
End: 2018-04-09

## 2018-04-09 VITALS
WEIGHT: 126.88 LBS | BODY MASS INDEX: 22.76 KG/M2 | TEMPERATURE: 99 F | DIASTOLIC BLOOD PRESSURE: 80 MMHG | HEIGHT: 62.75 IN | HEART RATE: 96 BPM | SYSTOLIC BLOOD PRESSURE: 132 MMHG

## 2018-04-09 DIAGNOSIS — J06.9 VIRAL UPPER RESPIRATORY TRACT INFECTION: Primary | ICD-10-CM

## 2018-04-09 PROCEDURE — 99212 OFFICE O/P EST SF 10 MIN: CPT | Performed by: INTERNAL MEDICINE

## 2018-04-09 PROCEDURE — 99214 OFFICE O/P EST MOD 30 MIN: CPT | Performed by: INTERNAL MEDICINE

## 2018-04-09 NOTE — PROGRESS NOTES
HPI:    Patient ID: Maximino Siddiqui is a 33year old female. Sore Throat    This is a new problem. Episode onset: 04/04/2018. There has been no fever.  Associated symptoms include coughing (productive of sputum), a hoarse voice and a plugged ear sens Alcohol use: Yes           0.0 oz/week     Comment: social            Current Outpatient Prescriptions:  Prenatal Vit-Fe Fumarate-FA (PRENATAL VITAMINS PLUS) 27-1 MG Oral Tab Take 1 tablet by mouth daily.  Disp:  Rfl:      Allergies:No Known Allergie documentation has been prepared under the direction and in the presence of NUBIA Franklin MD.   Electronically Signed: Steven Decker, 4/9/2018, 2:48 PM.    INUBIA MD,  personally performed the services described in this documentation.  All medic

## 2020-10-23 LAB
HBV SURFACE AG SER QL: NEGATIVE
HIV 1+2 AB+HIV1 P24 AG SERPL QL IA: NON REACTIVE

## 2021-01-01 ENCOUNTER — EXTERNAL RECORD (OUTPATIENT)
Dept: HEALTH INFORMATION MANAGEMENT | Age: 36
End: 2021-01-01

## 2021-02-25 LAB
HIV 1+2 AB+HIV1 P24 AG SERPL QL IA: NON REACTIVE
RPR SER QL: NON REACTIVE

## 2021-04-30 LAB — GBS EXTERNAL: NEGATIVE

## 2021-05-21 ENCOUNTER — ANESTHESIA EVENT (OUTPATIENT)
Dept: OBGYN | Age: 36
End: 2021-05-21

## 2021-05-21 ENCOUNTER — ANESTHESIA (OUTPATIENT)
Dept: OBGYN | Age: 36
End: 2021-05-21

## 2021-05-21 ENCOUNTER — HOSPITAL ENCOUNTER (INPATIENT)
Age: 36
LOS: 2 days | Discharge: HOME | End: 2021-05-23
Attending: OBSTETRICS & GYNECOLOGY | Admitting: OBSTETRICS & GYNECOLOGY

## 2021-05-21 LAB
ABO + RH BLD: NORMAL
APPEARANCE UR: CLEAR
BACTERIA #/AREA URNS HPF: ABNORMAL /HPF
BASOPHILS # BLD: 0 K/MCL (ref 0–0.3)
BASOPHILS NFR BLD: 0 %
BILIRUB UR QL STRIP: NEGATIVE
BLD GP AB SCN SERPL QL GEL: NEGATIVE
COLOR UR: YELLOW
DEPRECATED RDW RBC: 45.5 FL (ref 39–50)
EOSINOPHIL # BLD: 0.1 K/MCL (ref 0–0.5)
EOSINOPHIL NFR BLD: 1 %
ERYTHROCYTE [DISTWIDTH] IN BLOOD: 14.4 % (ref 11–15)
GLUCOSE UR STRIP-MCNC: NEGATIVE MG/DL
HCT VFR BLD CALC: 33.9 % (ref 36–46.5)
HGB BLD-MCNC: 11 G/DL (ref 12–15.5)
HGB UR QL STRIP: ABNORMAL
HYALINE CASTS #/AREA URNS LPF: ABNORMAL /LPF
IMM GRANULOCYTES # BLD AUTO: 0.1 K/MCL (ref 0–0.2)
IMM GRANULOCYTES # BLD: 1 %
KETONES UR STRIP-MCNC: NEGATIVE MG/DL
LEUKOCYTE ESTERASE UR QL STRIP: ABNORMAL
LYMPHOCYTES # BLD: 1.7 K/MCL (ref 1–4.8)
LYMPHOCYTES NFR BLD: 20 %
MCH RBC QN AUTO: 28.7 PG (ref 26–34)
MCHC RBC AUTO-ENTMCNC: 32.4 G/DL (ref 32–36.5)
MCV RBC AUTO: 88.5 FL (ref 78–100)
MONOCYTES # BLD: 0.8 K/MCL (ref 0.3–0.9)
MONOCYTES NFR BLD: 9 %
NEUTROPHILS # BLD: 5.9 K/MCL (ref 1.8–7.7)
NEUTROPHILS NFR BLD: 69 %
NITRITE UR QL STRIP: NEGATIVE
NRBC BLD MANUAL-RTO: 0 /100 WBC
PH UR STRIP: 7 [PH] (ref 5–7)
PLATELET # BLD AUTO: 255 K/MCL (ref 140–450)
PROT UR STRIP-MCNC: NEGATIVE MG/DL
RBC # BLD: 3.83 MIL/MCL (ref 4–5.2)
RBC #/AREA URNS HPF: ABNORMAL /HPF
SARS-COV-2 RNA RESP QL NAA+PROBE: NOT DETECTED
SERVICE CMNT-IMP: NORMAL
SERVICE CMNT-IMP: NORMAL
SP GR UR STRIP: 1.01 (ref 1–1.03)
SQUAMOUS #/AREA URNS HPF: ABNORMAL /HPF
TYPE AND SCREEN EXPIRATION DATE: NORMAL
UROBILINOGEN UR STRIP-MCNC: 0.2 MG/DL
WBC # BLD: 8.6 K/MCL (ref 4.2–11)
WBC #/AREA URNS HPF: ABNORMAL /HPF

## 2021-05-21 PROCEDURE — 87086 URINE CULTURE/COLONY COUNT: CPT | Performed by: OBSTETRICS & GYNECOLOGY

## 2021-05-21 PROCEDURE — 85025 COMPLETE CBC W/AUTO DIFF WBC: CPT | Performed by: OBSTETRICS & GYNECOLOGY

## 2021-05-21 PROCEDURE — 81001 URINALYSIS AUTO W/SCOPE: CPT | Performed by: OBSTETRICS & GYNECOLOGY

## 2021-05-21 PROCEDURE — 87635 SARS-COV-2 COVID-19 AMP PRB: CPT | Performed by: OBSTETRICS & GYNECOLOGY

## 2021-05-21 PROCEDURE — 86850 RBC ANTIBODY SCREEN: CPT | Performed by: OBSTETRICS & GYNECOLOGY

## 2021-05-21 PROCEDURE — 10002807 HB RX 258: Performed by: OBSTETRICS & GYNECOLOGY

## 2021-05-21 PROCEDURE — 13003286 HB ROOM CHARGE L&D

## 2021-05-21 PROCEDURE — 10002801 HB RX 250 W/O HCPCS

## 2021-05-21 PROCEDURE — 13000012 HB ANESTHESIA SPINAL/EPIDURAL IN L&D

## 2021-05-21 PROCEDURE — 36415 COLL VENOUS BLD VENIPUNCTURE: CPT | Performed by: OBSTETRICS & GYNECOLOGY

## 2021-05-21 RX ORDER — OXYTOCIN 10 [USP'U]/ML
10 INJECTION, SOLUTION INTRAMUSCULAR; INTRAVENOUS
Status: DISCONTINUED | OUTPATIENT
Start: 2021-05-21 | End: 2021-05-22 | Stop reason: CLARIF

## 2021-05-21 RX ORDER — SODIUM CHLORIDE, SODIUM LACTATE, POTASSIUM CHLORIDE, CALCIUM CHLORIDE 600; 310; 30; 20 MG/100ML; MG/100ML; MG/100ML; MG/100ML
INJECTION, SOLUTION INTRAVENOUS CONTINUOUS
Status: DISCONTINUED | OUTPATIENT
Start: 2021-05-21 | End: 2021-05-22 | Stop reason: CLARIF

## 2021-05-21 RX ORDER — EPHEDRINE SULFATE/0.9% NACL/PF 50 MG/10ML
10 SYRINGE (ML) INTRAVENOUS
Status: DISCONTINUED | OUTPATIENT
Start: 2021-05-21 | End: 2021-05-22 | Stop reason: CLARIF

## 2021-05-21 RX ORDER — EPHEDRINE SULFATE/0.9% NACL/PF 50 MG/10ML
5 SYRINGE (ML) INTRAVENOUS
Status: DISCONTINUED | OUTPATIENT
Start: 2021-05-21 | End: 2021-05-22 | Stop reason: CLARIF

## 2021-05-21 RX ORDER — HEPARIN SOD,PORK IN 0.45% NACL 25000/500
INTRAVENOUS SOLUTION INTRAVENOUS CONTINUOUS
Status: DISCONTINUED | OUTPATIENT
Start: 2021-05-21 | End: 2021-05-22 | Stop reason: CLARIF

## 2021-05-21 RX ORDER — HEPARIN SOD,PORK IN 0.45% NACL 25000/500
INTRAVENOUS SOLUTION INTRAVENOUS
Status: COMPLETED
Start: 2021-05-21 | End: 2021-05-21

## 2021-05-21 RX ORDER — ACETAMINOPHEN 650 MG/1
650 SUPPOSITORY RECTAL EVERY 4 HOURS PRN
Status: DISCONTINUED | OUTPATIENT
Start: 2021-05-21 | End: 2021-05-22 | Stop reason: CLARIF

## 2021-05-21 RX ORDER — ACYCLOVIR 400 MG/1
400 TABLET ORAL 3 TIMES DAILY
Status: ON HOLD | COMMUNITY
End: 2021-05-23 | Stop reason: HOSPADM

## 2021-05-21 RX ORDER — ACETAMINOPHEN 325 MG/1
650 TABLET ORAL EVERY 4 HOURS PRN
Status: DISCONTINUED | OUTPATIENT
Start: 2021-05-21 | End: 2021-05-22 | Stop reason: CLARIF

## 2021-05-21 RX ORDER — ONDANSETRON 2 MG/ML
4 INJECTION INTRAMUSCULAR; INTRAVENOUS 2 TIMES DAILY PRN
Status: DISCONTINUED | OUTPATIENT
Start: 2021-05-21 | End: 2021-05-22 | Stop reason: CLARIF

## 2021-05-21 RX ORDER — NALBUPHINE HYDROCHLORIDE 10 MG/ML
10 INJECTION, SOLUTION INTRAMUSCULAR; INTRAVENOUS; SUBCUTANEOUS
Status: DISCONTINUED | OUTPATIENT
Start: 2021-05-21 | End: 2021-05-21

## 2021-05-21 RX ORDER — LIDOCAINE HYDROCHLORIDE 10 MG/ML
30 INJECTION, SOLUTION EPIDURAL; INFILTRATION; INTRACAUDAL; PERINEURAL
Status: DISCONTINUED | OUTPATIENT
Start: 2021-05-21 | End: 2021-05-22 | Stop reason: CLARIF

## 2021-05-21 RX ORDER — VITAMIN A ACETATE, BETA CAROTENE, ASCORBIC ACID, CHOLECALCIFEROL, .ALPHA.-TOCOPHEROL ACETATE, DL-, THIAMINE MONONITRATE, RIBOFLAVIN, NIACINAMIDE, PYRIDOXINE HYDROCHLORIDE, FOLIC ACID, CYANOCOBALAMIN, CALCIUM CARBONATE, FERROUS FUMARATE, ZINC OXIDE, CUPRIC OXIDE 3080; 12; 120; 400; 1; 1.84; 3; 20; 22; 920; 25; 200; 27; 10; 2 [IU]/1; UG/1; MG/1; [IU]/1; MG/1; MG/1; MG/1; MG/1; MG/1; [IU]/1; MG/1; MG/1; MG/1; MG/1; MG/1
1 TABLET, FILM COATED ORAL DAILY
COMMUNITY

## 2021-05-21 RX ORDER — CALCIUM CARBONATE 500 MG/1
500 TABLET, CHEWABLE ORAL EVERY 4 HOURS PRN
Status: DISCONTINUED | OUTPATIENT
Start: 2021-05-21 | End: 2021-05-22 | Stop reason: CLARIF

## 2021-05-21 RX ORDER — OXYTOCIN-SODIUM CHLORIDE 0.9% IV SOLN 30 UNIT/500ML 30-0.9/5 UT/ML-%
0-334 SOLUTION INTRAVENOUS CONTINUOUS
Status: DISCONTINUED | OUTPATIENT
Start: 2021-05-21 | End: 2021-05-22 | Stop reason: CLARIF

## 2021-05-21 RX ADMIN — SODIUM CHLORIDE, POTASSIUM CHLORIDE, SODIUM LACTATE AND CALCIUM CHLORIDE: 600; 310; 30; 20 INJECTION, SOLUTION INTRAVENOUS at 22:24

## 2021-05-21 RX ADMIN — Medication 10 ML/HR: at 22:50

## 2021-05-21 RX ADMIN — SODIUM CHLORIDE, POTASSIUM CHLORIDE, SODIUM LACTATE AND CALCIUM CHLORIDE: 600; 310; 30; 20 INJECTION, SOLUTION INTRAVENOUS at 23:59

## 2021-05-21 SDOH — HEALTH STABILITY: MENTAL HEALTH: HOW OFTEN DO YOU HAVE A DRINK CONTAINING ALCOHOL?: NEVER

## 2021-05-21 ASSESSMENT — LIFESTYLE VARIABLES
ADL NEEDS ASSIST: NO
HISTORY OF PROBLEMS WHEN YOU STOP DRINKING ALCOHOL: NO
ADL BEFORE ADMISSION: INDEPENDENT
AUDIT-C TOTAL SCORE: 0
SHORT OF BREATH OR FATIGUE WITH ADLS: NO
LAST DRINK YOU HAD: DENIES INTAKE
ALCOHOL_USE_STATUS: NO OR LOW RISK WITH VALIDATED TOOL
HAVE YOU BEEN EATING POORLY BECAUSE OF A DECREASED APPETITE: 0
HOW OFTEN DO YOU HAVE A DRINK CONTAINING ALCOHOL: NEVER
HOW OFTEN DO YOU HAVE 6 OR MORE DRINKS ON ONE OCCASION: NEVER
RECENT DECLINE IN ADLS: NO
CHRONIC/CANCER PAIN PRESENT: NO
ARE YOU DEAF OR DO YOU HAVE SERIOUS DIFFICULTY  HEARING: NO
HOW MANY STANDARD DRINKS CONTAINING ALCOHOL DO YOU HAVE ON A TYPICAL DAY: 0,1 OR 2
RECENTLY LOST WEIGHT WITHOUT TRYING: 0
ARE YOU BLIND OR DO YOU HAVE SERIOUS DIFFICULTY SEEING, EVEN WHEN WEARING GLASSES: NO
IS PATIENT ABLE TO COMPLETE ASSESSMENT AT THIS TIME: YES

## 2021-05-21 ASSESSMENT — ACTIVITIES OF DAILY LIVING (ADL): ADL_SCORE: 12

## 2021-05-21 ASSESSMENT — ENCOUNTER SYMPTOMS: EXERCISE TOLERANCE: GOOD (>4 METS)

## 2021-05-21 ASSESSMENT — COGNITIVE AND FUNCTIONAL STATUS - GENERAL
DO YOU HAVE DIFFICULTY DRESSING OR BATHING: NO
BECAUSE OF A PHYSICAL, MENTAL, OR EMOTIONAL CONDITION, DO YOU HAVE DIFFICULTY DOING ERRANDS ALONE: NO
DO YOU HAVE SERIOUS DIFFICULTY WALKING OR CLIMBING STAIRS: NO
BECAUSE OF A PHYSICAL, MENTAL, OR EMOTIONAL CONDITION, DO YOU HAVE SERIOUS DIFFICULTY CONCENTRATING, REMEMBERING OR MAKING DECISIONS: NO

## 2021-05-22 LAB
BASE EXCESS / DEFICIT, ARTERIAL CORD BLOOD - RESPIRATORY: -7 MMOL/L
BASE EXCESS / DEFICIT, VENOUS CORD BLOOD - RESPIRATORY: -8 MMOL/L
HCO3 BLDCOA-SCNC: 24 MMOL/L (ref 21–28)
HCO3 BLDCOV-SCNC: 19 MMOL/L (ref 22–29)
PCO2 BLDCOA: 71 MM HG (ref 31–74)
PCO2 BLDCOV: 43 MM HG (ref 23–49)
PH BLDCOA: 7.13 UNITS (ref 7.18–7.38)
PH BLDCOV: 7.26 UNITS (ref 7.25–7.45)
PO2 BLDCOA: 26 MM HG (ref 6–31)
PO2 BLDCOV: 34 MM HG (ref 17–41)

## 2021-05-22 PROCEDURE — 10002800 HB RX 250 W HCPCS: Performed by: OBSTETRICS & GYNECOLOGY

## 2021-05-22 PROCEDURE — 10002803 HB RX 637

## 2021-05-22 PROCEDURE — 82803 BLOOD GASES ANY COMBINATION: CPT

## 2021-05-22 PROCEDURE — 88307 TISSUE EXAM BY PATHOLOGIST: CPT | Performed by: OBSTETRICS & GYNECOLOGY

## 2021-05-22 PROCEDURE — 13003251 HB VAGINAL DELIVERY HIGH RISK

## 2021-05-22 PROCEDURE — 10002803 HB RX 637: Performed by: OBSTETRICS & GYNECOLOGY

## 2021-05-22 PROCEDURE — 10000003 HB ROOM CHARGE WOMEN'S HEALTH

## 2021-05-22 PROCEDURE — 13000001 HB PHASE II RECOVERY EA 30 MINUTES

## 2021-05-22 PROCEDURE — 10004651 HB RX, NO CHARGE ITEM: Performed by: OBSTETRICS & GYNECOLOGY

## 2021-05-22 PROCEDURE — 10004281 HB COUNTER-STAFF TIME PER 15 MIN

## 2021-05-22 PROCEDURE — 13001455 HB PERINATAL CARE HIGH RISK

## 2021-05-22 PROCEDURE — 0HQ9XZZ REPAIR PERINEUM SKIN, EXTERNAL APPROACH: ICD-10-PCS | Performed by: OBSTETRICS & GYNECOLOGY

## 2021-05-22 RX ORDER — IBUPROFEN 600 MG/1
TABLET ORAL
Status: COMPLETED
Start: 2021-05-22 | End: 2021-05-22

## 2021-05-22 RX ORDER — AMOXICILLIN 250 MG
2 CAPSULE ORAL 2 TIMES DAILY PRN
Status: DISCONTINUED | OUTPATIENT
Start: 2021-05-22 | End: 2021-05-23 | Stop reason: HOSPADM

## 2021-05-22 RX ORDER — IBUPROFEN 600 MG/1
600 TABLET ORAL EVERY 6 HOURS PRN
Status: DISCONTINUED | OUTPATIENT
Start: 2021-05-22 | End: 2021-05-22 | Stop reason: SDUPTHER

## 2021-05-22 RX ORDER — ACYCLOVIR 200 MG/1
400 CAPSULE ORAL EVERY 8 HOURS SCHEDULED
Status: DISCONTINUED | OUTPATIENT
Start: 2021-05-23 | End: 2021-05-23 | Stop reason: HOSPADM

## 2021-05-22 RX ORDER — SIMETHICONE 125 MG
125 TABLET,CHEWABLE ORAL EVERY 4 HOURS PRN
Status: DISCONTINUED | OUTPATIENT
Start: 2021-05-22 | End: 2021-05-22 | Stop reason: SDUPTHER

## 2021-05-22 RX ORDER — ACETAMINOPHEN 325 MG/1
650 TABLET ORAL EVERY 4 HOURS PRN
Status: DISCONTINUED | OUTPATIENT
Start: 2021-05-22 | End: 2021-05-22 | Stop reason: SDUPTHER

## 2021-05-22 RX ORDER — POLYETHYLENE GLYCOL 3350 17 G/17G
17 POWDER, FOR SOLUTION ORAL DAILY PRN
Status: DISCONTINUED | OUTPATIENT
Start: 2021-05-22 | End: 2021-05-23 | Stop reason: HOSPADM

## 2021-05-22 RX ORDER — 0.9 % SODIUM CHLORIDE 0.9 %
2 VIAL (ML) INJECTION EVERY 12 HOURS SCHEDULED
Status: DISCONTINUED | OUTPATIENT
Start: 2021-05-22 | End: 2021-05-23 | Stop reason: HOSPADM

## 2021-05-22 RX ORDER — CALCIUM CARBONATE 500 MG/1
500 TABLET, CHEWABLE ORAL EVERY 4 HOURS PRN
Status: DISCONTINUED | OUTPATIENT
Start: 2021-05-22 | End: 2021-05-22 | Stop reason: SDUPTHER

## 2021-05-22 RX ORDER — IBUPROFEN 600 MG/1
600 TABLET ORAL EVERY 6 HOURS PRN
Status: DISCONTINUED | OUTPATIENT
Start: 2021-05-22 | End: 2021-05-23 | Stop reason: HOSPADM

## 2021-05-22 RX ORDER — VITAMIN A, VITAMIN C, VITAMIN D-3, VITAMIN E, VITAMIN B-1, VITAMIN B-2, NIACIN, VITAMIN B-6, CALCIUM, IRON, ZINC, COPPER 4000; 120; 400; 22; 1.84; 3; 20; 10; 1; 12; 200; 27; 25; 2 [IU]/1; MG/1; [IU]/1; MG/1; MG/1; MG/1; MG/1; MG/1; MG/1; UG/1; MG/1; MG/1; MG/1; MG/1
1 TABLET ORAL DAILY
Status: DISCONTINUED | OUTPATIENT
Start: 2021-05-22 | End: 2021-05-22 | Stop reason: SDUPTHER

## 2021-05-22 RX ORDER — ACETAMINOPHEN 325 MG/1
650 TABLET ORAL EVERY 4 HOURS PRN
Status: DISCONTINUED | OUTPATIENT
Start: 2021-05-22 | End: 2021-05-23 | Stop reason: HOSPADM

## 2021-05-22 RX ORDER — ACETAMINOPHEN 325 MG/1
650 TABLET ORAL EVERY 6 HOURS
Status: DISCONTINUED | OUTPATIENT
Start: 2021-05-22 | End: 2021-05-23 | Stop reason: HOSPADM

## 2021-05-22 RX ORDER — HYDROCORTISONE ACETATE 25 MG/1
25 SUPPOSITORY RECTAL EVERY 8 HOURS PRN
Status: DISCONTINUED | OUTPATIENT
Start: 2021-05-22 | End: 2021-05-23 | Stop reason: HOSPADM

## 2021-05-22 RX ORDER — ACYCLOVIR 800 MG/1
400 TABLET ORAL EVERY 8 HOURS SCHEDULED
Status: DISCONTINUED | OUTPATIENT
Start: 2021-05-22 | End: 2021-05-22 | Stop reason: CLARIF

## 2021-05-22 RX ORDER — VITAMIN A, VITAMIN C, VITAMIN D-3, VITAMIN E, VITAMIN B-1, VITAMIN B-2, NIACIN, VITAMIN B-6, CALCIUM, IRON, ZINC, COPPER 4000; 120; 400; 22; 1.84; 3; 20; 10; 1; 12; 200; 27; 25; 2 [IU]/1; MG/1; [IU]/1; MG/1; MG/1; MG/1; MG/1; MG/1; MG/1; UG/1; MG/1; MG/1; MG/1; MG/1
1 TABLET ORAL DAILY
Status: DISCONTINUED | OUTPATIENT
Start: 2021-05-22 | End: 2021-05-23 | Stop reason: HOSPADM

## 2021-05-22 RX ORDER — OXYTOCIN-SODIUM CHLORIDE 0.9% IV SOLN 30 UNIT/500ML 30-0.9/5 UT/ML-%
0-334 SOLUTION INTRAVENOUS CONTINUOUS
Status: DISCONTINUED | OUTPATIENT
Start: 2021-05-22 | End: 2021-05-23 | Stop reason: HOSPADM

## 2021-05-22 RX ORDER — IBUPROFEN 600 MG/1
600 TABLET ORAL EVERY 6 HOURS
Status: DISCONTINUED | OUTPATIENT
Start: 2021-05-22 | End: 2021-05-23 | Stop reason: HOSPADM

## 2021-05-22 RX ORDER — ACYCLOVIR 800 MG/1
400 TABLET ORAL 3 TIMES DAILY
Status: DISCONTINUED | OUTPATIENT
Start: 2021-05-22 | End: 2021-05-22 | Stop reason: ALTCHOICE

## 2021-05-22 RX ORDER — CALCIUM CARBONATE 500 MG/1
500 TABLET, CHEWABLE ORAL EVERY 4 HOURS PRN
Status: DISCONTINUED | OUTPATIENT
Start: 2021-05-22 | End: 2021-05-23 | Stop reason: HOSPADM

## 2021-05-22 RX ORDER — SIMETHICONE 125 MG
125 TABLET,CHEWABLE ORAL EVERY 4 HOURS PRN
Status: DISCONTINUED | OUTPATIENT
Start: 2021-05-22 | End: 2021-05-23 | Stop reason: HOSPADM

## 2021-05-22 RX ADMIN — IBUPROFEN 600 MG: 600 TABLET ORAL at 20:59

## 2021-05-22 RX ADMIN — IBUPROFEN 600 MG: 600 TABLET ORAL at 15:19

## 2021-05-22 RX ADMIN — ACETAMINOPHEN 650 MG: 325 TABLET ORAL at 20:59

## 2021-05-22 RX ADMIN — ACYCLOVIR 400 MG: 800 TABLET ORAL at 14:36

## 2021-05-22 RX ADMIN — IBUPROFEN 600 MG: 600 TABLET ORAL at 08:46

## 2021-05-22 RX ADMIN — OXYTOCIN-SODIUM CHLORIDE 0.9% IV SOLN 30 UNIT/500ML 334 ML/HR: 30-0.9/5 SOLUTION at 04:07

## 2021-05-22 RX ADMIN — ACYCLOVIR 400 MG: 800 TABLET ORAL at 21:00

## 2021-05-22 ASSESSMENT — PAIN SCALES - GENERAL
PAINLEVEL_OUTOF10: 5
PAINLEVEL_OUTOF10: 5
PAINLEVEL_OUTOF10: 3

## 2021-05-23 VITALS
WEIGHT: 191 LBS | RESPIRATION RATE: 18 BRPM | BODY MASS INDEX: 33.84 KG/M2 | TEMPERATURE: 97.5 F | HEART RATE: 84 BPM | SYSTOLIC BLOOD PRESSURE: 124 MMHG | DIASTOLIC BLOOD PRESSURE: 78 MMHG | HEIGHT: 63 IN | OXYGEN SATURATION: 100 %

## 2021-05-23 LAB
BACTERIA UR CULT: NORMAL
BASOPHILS # BLD: 0 K/MCL (ref 0–0.3)
BASOPHILS NFR BLD: 0 %
DEPRECATED RDW RBC: 50 FL (ref 39–50)
EOSINOPHIL # BLD: 0.2 K/MCL (ref 0–0.5)
EOSINOPHIL NFR BLD: 2 %
ERYTHROCYTE [DISTWIDTH] IN BLOOD: 14.8 % (ref 11–15)
HCT VFR BLD CALC: 30.9 % (ref 36–46.5)
HGB BLD-MCNC: 9.7 G/DL (ref 12–15.5)
IMM GRANULOCYTES # BLD AUTO: 0.1 K/MCL (ref 0–0.2)
IMM GRANULOCYTES # BLD: 2 %
LYMPHOCYTES # BLD: 2 K/MCL (ref 1–4.8)
LYMPHOCYTES NFR BLD: 24 %
MCH RBC QN AUTO: 29 PG (ref 26–34)
MCHC RBC AUTO-ENTMCNC: 31.4 G/DL (ref 32–36.5)
MCV RBC AUTO: 92.2 FL (ref 78–100)
MONOCYTES # BLD: 0.6 K/MCL (ref 0.3–0.9)
MONOCYTES NFR BLD: 7 %
NEUTROPHILS # BLD: 5.3 K/MCL (ref 1.8–7.7)
NEUTROPHILS NFR BLD: 65 %
NRBC BLD MANUAL-RTO: 0 /100 WBC
PLATELET # BLD AUTO: 188 K/MCL (ref 140–450)
RBC # BLD: 3.35 MIL/MCL (ref 4–5.2)
WBC # BLD: 8.3 K/MCL (ref 4.2–11)

## 2021-05-23 PROCEDURE — 36415 COLL VENOUS BLD VENIPUNCTURE: CPT | Performed by: OBSTETRICS & GYNECOLOGY

## 2021-05-23 PROCEDURE — 10004651 HB RX, NO CHARGE ITEM: Performed by: OBSTETRICS & GYNECOLOGY

## 2021-05-23 PROCEDURE — 10002803 HB RX 637: Performed by: OBSTETRICS & GYNECOLOGY

## 2021-05-23 PROCEDURE — 85025 COMPLETE CBC W/AUTO DIFF WBC: CPT | Performed by: OBSTETRICS & GYNECOLOGY

## 2021-05-23 RX ORDER — IBUPROFEN 600 MG/1
600 TABLET ORAL EVERY 6 HOURS PRN
Qty: 40 TABLET | Refills: 0 | Status: SHIPPED | OUTPATIENT
Start: 2021-05-23

## 2021-05-23 RX ADMIN — IBUPROFEN 600 MG: 600 TABLET ORAL at 05:03

## 2021-05-23 RX ADMIN — IBUPROFEN 600 MG: 600 TABLET ORAL at 10:07

## 2021-05-23 RX ADMIN — ACETAMINOPHEN 650 MG: 325 TABLET ORAL at 05:02

## 2021-05-23 RX ADMIN — ACYCLOVIR 400 MG: 200 CAPSULE ORAL at 10:08

## 2021-05-23 RX ADMIN — ACETAMINOPHEN 650 MG: 325 TABLET ORAL at 10:07

## 2021-05-23 RX ADMIN — VITAMIN A, VITAMIN C, VITAMIN D, VITAMIN E, THIAMINE, RIBOFLAVIN, NIACIN, VITAMIN B6, FOLIC ACID, VITAMIN B12, CALCIUM, IRON, ZINC, COPPER 1 TABLET: 4000; 120; 400; 22; 1.84; 3; 20; 10; 1; 12; 200; 27; 25; 2 TABLET ORAL at 10:07

## 2021-05-23 RX ADMIN — DOCUSATE SODIUM 50MG AND SENNOSIDES 8.6MG 2 TABLET: 8.6; 5 TABLET, FILM COATED ORAL at 10:10

## 2021-05-23 ASSESSMENT — PAIN SCALES - GENERAL
PAINLEVEL_OUTOF10: 0
PAINLEVEL_OUTOF10: 3

## 2021-05-27 LAB
ASR DISCLAIMER: NORMAL
CASE RPRT: NORMAL
CLINICAL INFO: NORMAL
PATH REPORT.FINAL DX SPEC: NORMAL
PATH REPORT.GROSS SPEC: NORMAL

## 2024-01-30 NOTE — PROGRESS NOTES
No S/S of PTL. Chronic right S-I pain and seeing Chiropractor- Dr Quang Lainez. Using manipulation, massage and acupuncture. Doing Rh lab today. operating room

## (undated) NOTE — Clinical Note
2017          RE:  Elena Waite  :  6/10/1985      To Whom It May Concern:    Christina Caldwell  is currently under our care for pregnancy. It is permissible at her gestational age for dental work to be performed.   However, if x-rays are

## (undated) NOTE — LETTER
If You Are Rh Negative – Routine Administration    If you’re Rh negative, ask your health care provider about getting treated with RhoGam or Rhophylac. Even if you miscarry or don’t deliver the baby, you will still need treatment.  The health of any baby yo o RhoGam or Rhophylac injection in your provider’s office as directed      Location, date and time:  [facility]  8/10/17  @  9:20am

## (undated) NOTE — LETTER
1/31/2018              [place] [id number]         Dear Paul Baeza,  This letter is being written to request a 2 week extension of Mikaela's maternity leave due to symptoms of postpartum and reactive dep

## (undated) NOTE — MR AVS SNAPSHOT
[address]  [address]  [phone]               Thank you for choosing us for your health care visit with Nurse.   We are glad to serve you and happy to provide you with this summary of your vis HIV Ag Ab Combo    Complete by: Mar 16, 2017 (Approximate)    Assoc Dx:  Encounter for supervision of normal first pregnancy in first trimester [Z34.01]           HCV Antibody    Complete by:   Mar 16, 2017 (Approximate)    Assoc Dx:  Encounter for Ok Beans Encounter for supervision of normal first pregnancy in first trimester    -  Primary    Pregnancy examination or test, positive result          Instructions and Information about Your Health     None      Allergies as of Mar 16, 2017     No Known Allergie Visit [facility] online at  [email or web address]

## (undated) NOTE — LETTER
AGREEMENT FOR TREATMENT WITH Rh IMMUNE GLOBULIN      To:   ________________________________________________ Lidia CHAIDEZ (provider) [facility], [address], [facility]     Date: _______________________________ Time: ___________________ AM

## (undated) NOTE — LETTER
VACCINE ADMINISTRATION RECORD  PARENT / GUARDIAN APPROVAL  Date: 2017  Vaccine administered to:  Ibeth Mayra     : 6/10/1985    MRN: [id number]    A copy of the appropriate Centers for Disease Control and Prevention Vaccine Information statem